# Patient Record
Sex: MALE | Race: WHITE | Employment: UNEMPLOYED | URBAN - METROPOLITAN AREA
[De-identification: names, ages, dates, MRNs, and addresses within clinical notes are randomized per-mention and may not be internally consistent; named-entity substitution may affect disease eponyms.]

---

## 2017-11-28 ENCOUNTER — APPOINTMENT (OUTPATIENT)
Dept: GENERAL RADIOLOGY | Age: 22
End: 2017-11-28

## 2017-11-28 ENCOUNTER — HOSPITAL ENCOUNTER (EMERGENCY)
Age: 22
Discharge: HOME | End: 2017-11-28
Attending: EMERGENCY MEDICINE

## 2017-11-28 VITALS
WEIGHT: 175 LBS | OXYGEN SATURATION: 98 % | RESPIRATION RATE: 18 BRPM | HEIGHT: 71 IN | SYSTOLIC BLOOD PRESSURE: 133 MMHG | DIASTOLIC BLOOD PRESSURE: 85 MMHG | TEMPERATURE: 97.2 F | BODY MASS INDEX: 24.5 KG/M2 | HEART RATE: 74 BPM

## 2017-11-28 DIAGNOSIS — S63.299A DISLOCATION OF DISTAL INTERPHALANGEAL (DIP) JOINT OF FINGER, INITIAL ENCOUNTER: Primary | ICD-10-CM

## 2017-11-28 PROCEDURE — 6370000000 HC RX 637 (ALT 250 FOR IP): Performed by: EMERGENCY MEDICINE

## 2017-11-28 PROCEDURE — 73140 X-RAY EXAM OF FINGER(S): CPT

## 2017-11-28 PROCEDURE — 99283 EMERGENCY DEPT VISIT LOW MDM: CPT

## 2017-11-28 RX ORDER — IBUPROFEN 800 MG/1
800 TABLET ORAL ONCE
Status: COMPLETED | OUTPATIENT
Start: 2017-11-28 | End: 2017-11-28

## 2017-11-28 RX ADMIN — IBUPROFEN 800 MG: 800 TABLET, FILM COATED ORAL at 21:20

## 2017-11-28 ASSESSMENT — PAIN SCALES - GENERAL
PAINLEVEL_OUTOF10: 7
PAINLEVEL_OUTOF10: 7

## 2017-11-28 ASSESSMENT — PAIN DESCRIPTION - LOCATION: LOCATION: FINGER (COMMENT WHICH ONE)

## 2017-11-28 ASSESSMENT — ENCOUNTER SYMPTOMS
SORE THROAT: 0
SHORTNESS OF BREATH: 0
EYE PAIN: 0
DIARRHEA: 0
EYE DISCHARGE: 0
VOMITING: 0
NAUSEA: 0
COUGH: 0
ABDOMINAL PAIN: 0

## 2017-11-28 ASSESSMENT — PAIN DESCRIPTION - DESCRIPTORS: DESCRIPTORS: DISCOMFORT

## 2017-11-28 ASSESSMENT — PAIN DESCRIPTION - ORIENTATION: ORIENTATION: LEFT

## 2017-11-29 NOTE — ED PROVIDER NOTES
[facility]  Emergency Department Encounter  Emergency Medicine Resident     Pt Name: Ellis Caldwell  MRN: [id number]  [place] 1995  Date of evaluation: 11/28/17  PCP:  No primary care provider on file. CHIEF COMPLAINT       Chief Complaint   Patient presents with    Finger Pain     left pinky       HISTORY OF PRESENT ILLNESS  (Location/Symptom, Timing/Onset, Context/Setting, Quality, Duration, Modifying Factors, Severity.)      Ellis Caldwell is a 20 y.o. male who presents with Left Fifth finger pain and deformity after jamming it while playing basketball. She states he was passed the ball when he jammed his finger, notes mild numbness to the fingertip. Patient states he is unable to bend his finger due to the deformity. Denies any other injury. Patient is right-handed. PAST MEDICAL / SURGICAL / SOCIAL / FAMILY HISTORY      has a past medical history of Depressive disorder, not elsewhere classified; History of ADHD; Marijuana use, continuous; and Tobacco dependence. has no past surgical history on file. Social History     Social History    Marital status: Single     Spouse name: N/A    Number of children: N/A    Years of education: N/A     Occupational History    Not on file. Social History Main Topics    Smoking status: Current Every Day Smoker     Packs/day: 0.50     Types: Cigarettes    Smokeless tobacco: Never Used    Alcohol use Yes    Drug use:      Types: Marijuana    Sexual activity: Not on file     Other Topics Concern    Not on file     Social History Narrative    No narrative on file       Family History   Problem Relation Age of Onset    Diabetes       grandfather       Allergies:  Sulfa antibiotics    Home Medications:  Prior to Admission medications    Medication Sig Start Date End Date Taking? Authorizing Provider   ibuprofen (ADVIL;MOTRIN) 800 MG tablet Take 1 tablet by mouth every 8 hours as needed for Pain.  11/23/14   Irma Poole MD citalopram (CELEXA) 20 MG tablet Take 1 tablet by mouth daily. 11/17/14   Massiel Ruvalcaba MD       REVIEW OF SYSTEMS    (2-9 systems for level 4, 10 or more for level 5)      Review of Systems   Constitutional: Negative for chills, diaphoresis and fever. HENT: Negative for congestion and sore throat. Eyes: Negative for pain and discharge. Respiratory: Negative for cough and shortness of breath. Cardiovascular: Negative for chest pain and leg swelling. Gastrointestinal: Negative for abdominal pain, diarrhea, nausea and vomiting. Musculoskeletal: Negative for neck pain and neck stiffness. Skin: Negative for pallor and rash. Allergic/Immunologic: Negative for environmental allergies and food allergies. Neurological: Positive for numbness. Negative for weakness and headaches. Hematological: Negative for adenopathy. Does not bruise/bleed easily. Psychiatric/Behavioral: Negative for hallucinations and suicidal ideas. PHYSICAL EXAM   (up to 7 for level 4, 8 or more for level 5)      INITIAL VITALS:   /85   Pulse 74   Temp 97.2 °F (36.2 °C) (Oral)   Resp 18   Ht 5' 11\" (1.803 m)   Wt 175 lb (79.4 kg)   SpO2 98%   BMI 24.41 kg/m²     Physical Exam   Constitutional: He is oriented to person, place, and time. He appears well-developed and well-nourished. No distress. HENT:   Head: Normocephalic and atraumatic. Mouth/Throat: Oropharynx is clear and moist.   Neck: Normal range of motion. Neck supple. Cardiovascular: Normal rate and regular rhythm. Exam reveals no gallop and no friction rub. No murmur heard. Pulmonary/Chest: Effort normal and breath sounds normal. No respiratory distress. He has no wheezes. He has no rales. Abdominal: Soft. Bowel sounds are normal. There is no tenderness. There is no rebound and no guarding. Musculoskeletal: He exhibits deformity. He exhibits no edema.    Lateral deformity to the DIP of the left fifth finger, mild tenderness surrounding,

## 2017-11-29 NOTE — ED PROVIDER NOTES
I performed a history and physical examination of the patient and discussed management with the resident. I reviewed the residents note and agree with the documented findings and plan of care. Any areas of disagreement are noted on the chart. I was personally present for the key portions of any procedures. I have documented in the chart those procedures where I was not present during the key portions. I have reviewed the emergency nurses triage note. I agree with the chief complaint, past medical history, past surgical history, allergies, medications, social and family history as documented unless otherwise noted below. Documentation of the HPI, Physical Exam and Medical Decision Making performed by medical students or scribes is based on my personal performance of the HPI, PE and MDM. For Phys Assistant/ Nurse Practitioner cases/documentation I have personally evaluated this patient and have completed at least one if not all key elements of the E/M (history, physical exam, and MDM). I find the patient's history and physical exam are consistent with the NP/PA documentation. I agree with the care provided, treatment rendered, disposition and followup plan. Additional findings are as noted. Satnam Dao. Esequiel Goldmann, MD  Attending Emergency  Physician    PAIN, DEFORMITY LEFT PINKIE FINGER AT DIP-INJURED [facility]. RHD. NO NUMBNESS, TINGLING. AWAKE, ALERT, COOP, RESPONSIVE. LEFT HAND/PINKIE DIGIT-TENDERNESS, DEFORMITY AT DIP. LIMITED AROM OF DIGIT SECONDARY TO DISCOMFORT AND DEFORMITY. DISTAL CAP REFILL, SENSATION INTACT. RADIOGRAPH-DORSOLAT DISLOCATION OF DISTAL ON PROX PHALANX. NO OBVIOUS FX. IMP-DISLOCATION PINKIE DIP  PLAN-DIGITAL BLOCK, REDUCTION, SPLINTING. Erin Garrett MD  11/28/17 3036  DISLOCATION REDUCED PER DR MANNING. I WAS PRESENT FOR THE ENTIRETY OF THE PROCEDURE. POSTREDUCTION FILMS OK. LILLIE TAPE, DISCHARGE.       Erin Garrett MD  11/28/17 [id number] Dayana Anaya
